# Patient Record
Sex: MALE | Race: WHITE
[De-identification: names, ages, dates, MRNs, and addresses within clinical notes are randomized per-mention and may not be internally consistent; named-entity substitution may affect disease eponyms.]

---

## 2017-03-21 PROBLEM — Z00.00 ENCOUNTER FOR PREVENTIVE HEALTH EXAMINATION: Status: ACTIVE | Noted: 2017-03-21

## 2017-03-24 ENCOUNTER — APPOINTMENT (OUTPATIENT)
Dept: MRI IMAGING | Facility: CLINIC | Age: 37
End: 2017-03-24

## 2017-03-24 ENCOUNTER — OUTPATIENT (OUTPATIENT)
Dept: OUTPATIENT SERVICES | Facility: HOSPITAL | Age: 37
LOS: 1 days | End: 2017-03-24
Payer: COMMERCIAL

## 2017-03-24 DIAGNOSIS — Z00.8 ENCOUNTER FOR OTHER GENERAL EXAMINATION: ICD-10-CM

## 2017-04-21 PROCEDURE — 73221 MRI JOINT UPR EXTREM W/O DYE: CPT

## 2017-08-14 ENCOUNTER — OUTPATIENT (OUTPATIENT)
Dept: OUTPATIENT SERVICES | Facility: HOSPITAL | Age: 37
LOS: 1 days | End: 2017-08-14

## 2017-08-14 VITALS
HEART RATE: 80 BPM | HEIGHT: 71.5 IN | SYSTOLIC BLOOD PRESSURE: 110 MMHG | WEIGHT: 231.93 LBS | DIASTOLIC BLOOD PRESSURE: 70 MMHG | TEMPERATURE: 98 F | RESPIRATION RATE: 16 BRPM

## 2017-08-14 DIAGNOSIS — K08.499 PARTIAL LOSS OF TEETH DUE TO OTHER SPECIFIED CAUSE, UNSPECIFIED CLASS: Chronic | ICD-10-CM

## 2017-08-14 DIAGNOSIS — S63.501A UNSPECIFIED SPRAIN OF RIGHT WRIST, INITIAL ENCOUNTER: ICD-10-CM

## 2017-08-14 LAB
HCT VFR BLD CALC: 45.9 % — SIGNIFICANT CHANGE UP (ref 39–50)
HGB BLD-MCNC: 15.2 G/DL — SIGNIFICANT CHANGE UP (ref 13–17)
MCHC RBC-ENTMCNC: 31.7 PG — SIGNIFICANT CHANGE UP (ref 27–34)
MCHC RBC-ENTMCNC: 33.1 % — SIGNIFICANT CHANGE UP (ref 32–36)
MCV RBC AUTO: 95.6 FL — SIGNIFICANT CHANGE UP (ref 80–100)
NRBC # FLD: 0 — SIGNIFICANT CHANGE UP
PLATELET # BLD AUTO: 134 K/UL — LOW (ref 150–400)
PMV BLD: 10.6 FL — SIGNIFICANT CHANGE UP (ref 7–13)
RBC # BLD: 4.8 M/UL — SIGNIFICANT CHANGE UP (ref 4.2–5.8)
RBC # FLD: 12.1 % — SIGNIFICANT CHANGE UP (ref 10.3–14.5)
WBC # BLD: 6.36 K/UL — SIGNIFICANT CHANGE UP (ref 3.8–10.5)
WBC # FLD AUTO: 6.36 K/UL — SIGNIFICANT CHANGE UP (ref 3.8–10.5)

## 2017-08-14 NOTE — H&P PST ADULT - NSANTHOSAYNRD_GEN_A_CORE
No. MAYRA screening performed.  STOP BANG Legend: 0-2 = LOW Risk; 3-4 = INTERMEDIATE Risk; 5-8 = HIGH Risk

## 2017-08-14 NOTE — H&P PST ADULT - PROBLEM SELECTOR PLAN 1
Patient scheduled right wrist arthroscopy scapholunate repair and internal brace8/28/17.  preop instructions given and explained, patient verbalized understanding

## 2017-08-14 NOTE — H&P PST ADULT - HISTORY OF PRESENT ILLNESS
38 y/o male with no significant pmhx presents to PST for scheduled right wrist arthroscopy scapholunate repair and internal brace 8/28/17. Patient reports during bowling tournament in 1/17, he felt sharp pain to right wrist. Seen at the ED no fractures noted sent to orthopedic who advised brace. Patient states pain persist and switch to another orthopedic and was given steroid injection x1 with slight relief 6 weeks ago.

## 2017-08-14 NOTE — H&P PST ADULT - ASSESSMENT
36 y/o male with no significant pmhx presents to [facility] for scheduled for right wrist arthroscopy scapholunate repair and internal brace 8/28/17. Patient reports during bowling tournament in 1/17, he felt sharp pain to right wrist. Seen at the ED no fractures noted sent to orthopedic who advised brace. Patient states pain persist and switch to another orthopedic and was given steroid injection x1 with slight relief 6 weeks ago.

## 2017-08-14 NOTE — H&P PST ADULT - MUSCULOSKELETAL
details… detailed exam normal strength/no joint warmth/ROM intact/no joint erythema/no joint swelling

## 2017-08-28 ENCOUNTER — OUTPATIENT (OUTPATIENT)
Dept: OUTPATIENT SERVICES | Facility: HOSPITAL | Age: 37
LOS: 1 days | Discharge: HOME | End: 2017-08-28

## 2017-08-28 ENCOUNTER — TRANSCRIPTION ENCOUNTER (OUTPATIENT)
Age: 37
End: 2017-08-28

## 2017-08-28 VITALS
HEIGHT: 71.5 IN | OXYGEN SATURATION: 98 % | DIASTOLIC BLOOD PRESSURE: 72 MMHG | WEIGHT: 231.93 LBS | HEART RATE: 60 BPM | RESPIRATION RATE: 16 BRPM | SYSTOLIC BLOOD PRESSURE: 124 MMHG | TEMPERATURE: 98 F

## 2017-08-28 VITALS
OXYGEN SATURATION: 98 % | RESPIRATION RATE: 15 BRPM | HEART RATE: 65 BPM | DIASTOLIC BLOOD PRESSURE: 65 MMHG | SYSTOLIC BLOOD PRESSURE: 110 MMHG

## 2017-08-28 DIAGNOSIS — S63.501A UNSPECIFIED SPRAIN OF RIGHT WRIST, INITIAL ENCOUNTER: ICD-10-CM

## 2017-08-28 DIAGNOSIS — K08.499 PARTIAL LOSS OF TEETH DUE TO OTHER SPECIFIED CAUSE, UNSPECIFIED CLASS: Chronic | ICD-10-CM

## 2017-08-28 RX ORDER — ONDANSETRON 8 MG/1
1 TABLET, FILM COATED ORAL
Qty: 15 | Refills: 0 | OUTPATIENT
Start: 2017-08-28 | End: 2017-09-02

## 2017-08-28 RX ORDER — OXYCODONE HYDROCHLORIDE 5 MG/1
1 TABLET ORAL
Qty: 30 | Refills: 0 | OUTPATIENT
Start: 2017-08-28 | End: 2017-09-02

## 2017-08-28 NOTE — BRIEF OPERATIVE NOTE - OPERATION/FINDINGS
See dictated report.  Findings - R wrist scapholunate ligament tear  Procedure - R wrist arthroscopy, SL ligament reconstruction See dictated report.  Findings - R wrist scapholunate ligament tear  Procedure - R wrist arthroscopy, SL ligament reconstruction with palmar longus autograft

## 2017-08-28 NOTE — ASU DISCHARGE PLAN (ADULT/PEDIATRIC). - MEDICATION SUMMARY - MEDICATIONS TO TAKE
I will START or STAY ON the medications listed below when I get home from the hospital:    see medication sheet in chart  --     -- Indication: For Home med    oxyCODONE 5 mg oral tablet  -- 1-2 tab(s) by mouth every 4-6 hours, As Needed MDD:8 tabs  -- Caution federal law prohibits the transfer of this drug to any person other  than the person for whom it was prescribed.  It is very important that you take or use this exactly as directed.  Do not skip doses or discontinue unless directed by your doctor.  May cause drowsiness.  Alcohol may intensify this effect.  Use care when operating dangerous machinery.  This prescription cannot be refilled.  Using more of this medication than prescribed may cause serious breathing problems.    -- Indication: For Pain    Zofran ODT 4 mg oral tablet, disintegrating  -- 1 tab(s) by mouth 3 times a day, As Needed  -- Indication: For Nausea

## 2017-08-28 NOTE — ASU DISCHARGE PLAN (ADULT/PEDIATRIC). - ACTIVITY LEVEL
no heavy lifting no heavy lifting/no sports/gym/no exercise no exercise/elevate extremity/no heavy lifting/no sports/gym

## 2017-08-28 NOTE — ASU DISCHARGE PLAN (ADULT/PEDIATRIC). - SPECIAL INSTRUCTIONS
See instruction sheet. Please refer to MD pre-printed instruction sheet. Please refer to MD pre-printed instruction sheet.    Perform exercises as directed. May remove sling when numbness wears off. Elevate extremity.  Apply ice for 20 minutes several times per day for the next 24-48 hours, then as needed for comfort.

## 2017-08-28 NOTE — ASU DISCHARGE PLAN (ADULT/PEDIATRIC). - NOTIFY
Fever greater than 101/Bleeding that does not stop/Persistent Nausea and Vomiting/Pain not relieved by Medications/Swelling that continues/Numbness, color, or temperature change to extremity

## 2017-08-28 NOTE — ASU PREOP CHECKLIST - SURGICAL CONSENT
right wrist arthroscopy , scapholunaterepair right wrist arthroscopy , scapholunate repair done/right wrist arthroscopy , scapholunate repair

## 2022-09-26 NOTE — H&P PST ADULT - CIGARETTES, NUMBER OF YRS
5 Erythromycin Counseling:  I discussed with the patient the risks of erythromycin including but not limited to GI upset, allergic reaction, drug rash, diarrhea, increase in liver enzymes, and yeast infections.

## 2024-01-05 NOTE — ASU PREOP CHECKLIST - DNR CLARIFICATION FORM COMPLETED
Detail Level: Detailed Sunscreen Recommendation Label Override: Broad spectrum SPF 30+ Detail Level: Simple n/a